# Patient Record
Sex: FEMALE | Race: WHITE | URBAN - METROPOLITAN AREA
[De-identification: names, ages, dates, MRNs, and addresses within clinical notes are randomized per-mention and may not be internally consistent; named-entity substitution may affect disease eponyms.]

---

## 2018-01-28 ENCOUNTER — HOSPITAL ENCOUNTER (OUTPATIENT)
Facility: CLINIC | Age: 83
Setting detail: OBSERVATION
Discharge: HOME | End: 2018-01-29
Attending: EMERGENCY MEDICINE | Admitting: INTERNAL MEDICINE
Payer: COMMERCIAL

## 2018-01-28 ENCOUNTER — APPOINTMENT (OUTPATIENT)
Dept: CT IMAGING | Facility: CLINIC | Age: 83
End: 2018-01-28
Attending: EMERGENCY MEDICINE
Payer: COMMERCIAL

## 2018-01-28 DIAGNOSIS — I26.99 OTHER ACUTE PULMONARY EMBOLISM WITHOUT ACUTE COR PULMONALE (H): ICD-10-CM

## 2018-01-28 PROBLEM — I82.409 DVT (DEEP VENOUS THROMBOSIS) (H): Status: ACTIVE | Noted: 2018-01-28

## 2018-01-28 LAB
ALBUMIN SERPL-MCNC: 3.5 G/DL (ref 3.4–5)
ALP SERPL-CCNC: 79 U/L (ref 40–150)
ALT SERPL W P-5'-P-CCNC: 31 U/L (ref 0–50)
ANION GAP SERPL CALCULATED.3IONS-SCNC: 7 MMOL/L (ref 3–14)
APTT PPP: 29 SEC (ref 22–37)
AST SERPL W P-5'-P-CCNC: 19 U/L (ref 0–45)
BASOPHILS # BLD AUTO: 0 10E9/L (ref 0–0.2)
BASOPHILS NFR BLD AUTO: 0.1 %
BILIRUB SERPL-MCNC: 0.8 MG/DL (ref 0.2–1.3)
BUN SERPL-MCNC: 18 MG/DL (ref 7–30)
CALCIUM SERPL-MCNC: 9 MG/DL (ref 8.5–10.1)
CHLORIDE SERPL-SCNC: 106 MMOL/L (ref 94–109)
CO2 SERPL-SCNC: 27 MMOL/L (ref 20–32)
CREAT SERPL-MCNC: 0.75 MG/DL (ref 0.52–1.04)
D DIMER PPP FEU-MCNC: 0.6 UG/ML FEU (ref 0–0.5)
DIFFERENTIAL METHOD BLD: NORMAL
EOSINOPHIL # BLD AUTO: 0.2 10E9/L (ref 0–0.7)
EOSINOPHIL NFR BLD AUTO: 2 %
ERYTHROCYTE [DISTWIDTH] IN BLOOD BY AUTOMATED COUNT: 13.9 % (ref 10–15)
GFR SERPL CREATININE-BSD FRML MDRD: 73 ML/MIN/1.7M2
GLUCOSE SERPL-MCNC: 87 MG/DL (ref 70–99)
HCT VFR BLD AUTO: 44.2 % (ref 35–47)
HGB BLD-MCNC: 14.6 G/DL (ref 11.7–15.7)
IMM GRANULOCYTES # BLD: 0 10E9/L (ref 0–0.4)
IMM GRANULOCYTES NFR BLD: 0.2 %
INR PPP: 1.03 (ref 0.86–1.14)
INTERPRETATION ECG - MUSE: NORMAL
LYMPHOCYTES # BLD AUTO: 1.5 10E9/L (ref 0.8–5.3)
LYMPHOCYTES NFR BLD AUTO: 17.8 %
MCH RBC QN AUTO: 30.2 PG (ref 26.5–33)
MCHC RBC AUTO-ENTMCNC: 33 G/DL (ref 31.5–36.5)
MCV RBC AUTO: 92 FL (ref 78–100)
MONOCYTES # BLD AUTO: 0.6 10E9/L (ref 0–1.3)
MONOCYTES NFR BLD AUTO: 7 %
NEUTROPHILS # BLD AUTO: 6.2 10E9/L (ref 1.6–8.3)
NEUTROPHILS NFR BLD AUTO: 72.9 %
NRBC # BLD AUTO: 0 10*3/UL
NRBC BLD AUTO-RTO: 0 /100
PLATELET # BLD AUTO: 212 10E9/L (ref 150–450)
POTASSIUM SERPL-SCNC: 3.8 MMOL/L (ref 3.4–5.3)
PROT SERPL-MCNC: 7.1 G/DL (ref 6.8–8.8)
RADIOLOGIST FLAGS: ABNORMAL
RBC # BLD AUTO: 4.83 10E12/L (ref 3.8–5.2)
SODIUM SERPL-SCNC: 140 MMOL/L (ref 133–144)
TROPONIN I SERPL-MCNC: 0.04 UG/L (ref 0–0.04)
WBC # BLD AUTO: 8.5 10E9/L (ref 4–11)

## 2018-01-28 PROCEDURE — 96375 TX/PRO/DX INJ NEW DRUG ADDON: CPT

## 2018-01-28 PROCEDURE — 25000128 H RX IP 250 OP 636

## 2018-01-28 PROCEDURE — 99207 ZZC DOWN CODE DUE TO INITIAL EXAM: CPT | Performed by: INTERNAL MEDICINE

## 2018-01-28 PROCEDURE — 25000132 ZZH RX MED GY IP 250 OP 250 PS 637: Performed by: EMERGENCY MEDICINE

## 2018-01-28 PROCEDURE — 99285 EMERGENCY DEPT VISIT HI MDM: CPT | Mod: 25

## 2018-01-28 PROCEDURE — G0378 HOSPITAL OBSERVATION PER HR: HCPCS

## 2018-01-28 PROCEDURE — 84484 ASSAY OF TROPONIN QUANT: CPT | Performed by: EMERGENCY MEDICINE

## 2018-01-28 PROCEDURE — 80053 COMPREHEN METABOLIC PANEL: CPT | Performed by: EMERGENCY MEDICINE

## 2018-01-28 PROCEDURE — 25000128 H RX IP 250 OP 636: Performed by: EMERGENCY MEDICINE

## 2018-01-28 PROCEDURE — 85025 COMPLETE CBC W/AUTO DIFF WBC: CPT | Performed by: EMERGENCY MEDICINE

## 2018-01-28 PROCEDURE — 96374 THER/PROPH/DIAG INJ IV PUSH: CPT | Mod: 59

## 2018-01-28 PROCEDURE — 99218 ZZC INITIAL OBSERVATION CARE,LEVL I: CPT | Performed by: INTERNAL MEDICINE

## 2018-01-28 PROCEDURE — 93005 ELECTROCARDIOGRAM TRACING: CPT

## 2018-01-28 PROCEDURE — 96361 HYDRATE IV INFUSION ADD-ON: CPT

## 2018-01-28 PROCEDURE — 85610 PROTHROMBIN TIME: CPT | Performed by: EMERGENCY MEDICINE

## 2018-01-28 PROCEDURE — 85379 FIBRIN DEGRADATION QUANT: CPT | Performed by: EMERGENCY MEDICINE

## 2018-01-28 PROCEDURE — 85730 THROMBOPLASTIN TIME PARTIAL: CPT | Performed by: EMERGENCY MEDICINE

## 2018-01-28 PROCEDURE — 25000125 ZZHC RX 250: Performed by: EMERGENCY MEDICINE

## 2018-01-28 PROCEDURE — 71260 CT THORAX DX C+: CPT

## 2018-01-28 RX ORDER — ACETAMINOPHEN 325 MG/1
650 TABLET ORAL EVERY 4 HOURS PRN
Status: DISCONTINUED | OUTPATIENT
Start: 2018-01-28 | End: 2018-01-29 | Stop reason: HOSPADM

## 2018-01-28 RX ORDER — SODIUM CHLORIDE 9 MG/ML
INJECTION, SOLUTION INTRAVENOUS ONCE
Status: COMPLETED | OUTPATIENT
Start: 2018-01-28 | End: 2018-01-28

## 2018-01-28 RX ORDER — MORPHINE SULFATE 4 MG/ML
4 INJECTION, SOLUTION INTRAMUSCULAR; INTRAVENOUS ONCE
Status: COMPLETED | OUTPATIENT
Start: 2018-01-28 | End: 2018-01-28

## 2018-01-28 RX ORDER — ZOLPIDEM TARTRATE 5 MG/1
5 TABLET ORAL
Status: DISCONTINUED | OUTPATIENT
Start: 2018-01-28 | End: 2018-01-29

## 2018-01-28 RX ORDER — LIDOCAINE 40 MG/G
CREAM TOPICAL
Status: DISCONTINUED | OUTPATIENT
Start: 2018-01-28 | End: 2018-01-29 | Stop reason: HOSPADM

## 2018-01-28 RX ORDER — ZOLPIDEM TARTRATE 5 MG/1
5 TABLET ORAL AT BEDTIME
Status: DISCONTINUED | OUTPATIENT
Start: 2018-01-28 | End: 2018-01-28

## 2018-01-28 RX ORDER — HYDROMORPHONE HYDROCHLORIDE 1 MG/ML
0.5 INJECTION, SOLUTION INTRAMUSCULAR; INTRAVENOUS; SUBCUTANEOUS
Status: DISCONTINUED | OUTPATIENT
Start: 2018-01-28 | End: 2018-01-29 | Stop reason: HOSPADM

## 2018-01-28 RX ORDER — NALOXONE HYDROCHLORIDE 0.4 MG/ML
.1-.4 INJECTION, SOLUTION INTRAMUSCULAR; INTRAVENOUS; SUBCUTANEOUS
Status: DISCONTINUED | OUTPATIENT
Start: 2018-01-28 | End: 2018-01-29 | Stop reason: HOSPADM

## 2018-01-28 RX ORDER — ONDANSETRON 4 MG/1
4 TABLET, ORALLY DISINTEGRATING ORAL EVERY 6 HOURS PRN
Status: DISCONTINUED | OUTPATIENT
Start: 2018-01-28 | End: 2018-01-29 | Stop reason: HOSPADM

## 2018-01-28 RX ORDER — IOPAMIDOL 755 MG/ML
59 INJECTION, SOLUTION INTRAVASCULAR ONCE
Status: COMPLETED | OUTPATIENT
Start: 2018-01-28 | End: 2018-01-28

## 2018-01-28 RX ORDER — METOPROLOL SUCCINATE 50 MG/1
50 TABLET, EXTENDED RELEASE ORAL DAILY
Status: DISCONTINUED | OUTPATIENT
Start: 2018-01-29 | End: 2018-01-29 | Stop reason: HOSPADM

## 2018-01-28 RX ORDER — ONDANSETRON 2 MG/ML
4 INJECTION INTRAMUSCULAR; INTRAVENOUS EVERY 6 HOURS PRN
Status: DISCONTINUED | OUTPATIENT
Start: 2018-01-28 | End: 2018-01-29 | Stop reason: HOSPADM

## 2018-01-28 RX ORDER — ONDANSETRON 2 MG/ML
INJECTION INTRAMUSCULAR; INTRAVENOUS
Status: COMPLETED
Start: 2018-01-28 | End: 2018-01-28

## 2018-01-28 RX ORDER — MULTIPLE VITAMINS W/ MINERALS TAB 9MG-400MCG
1 TAB ORAL DAILY
COMMUNITY

## 2018-01-28 RX ORDER — HYDROCODONE BITARTRATE AND ACETAMINOPHEN 5; 325 MG/1; MG/1
1-2 TABLET ORAL EVERY 4 HOURS PRN
Status: DISCONTINUED | OUTPATIENT
Start: 2018-01-28 | End: 2018-01-29 | Stop reason: HOSPADM

## 2018-01-28 RX ORDER — HYDROMORPHONE HYDROCHLORIDE 2 MG/1
2 TABLET ORAL
Status: DISCONTINUED | OUTPATIENT
Start: 2018-01-28 | End: 2018-01-29 | Stop reason: HOSPADM

## 2018-01-28 RX ORDER — ACETAMINOPHEN 650 MG/1
650 SUPPOSITORY RECTAL EVERY 4 HOURS PRN
Status: DISCONTINUED | OUTPATIENT
Start: 2018-01-28 | End: 2018-01-29 | Stop reason: HOSPADM

## 2018-01-28 RX ORDER — HYDROMORPHONE HYDROCHLORIDE 1 MG/ML
INJECTION, SOLUTION INTRAMUSCULAR; INTRAVENOUS; SUBCUTANEOUS
Status: COMPLETED
Start: 2018-01-28 | End: 2018-01-28

## 2018-01-28 RX ADMIN — HYDROMORPHONE HYDROCHLORIDE 0.5 MG: 1 INJECTION, SOLUTION INTRAMUSCULAR; INTRAVENOUS; SUBCUTANEOUS at 14:33

## 2018-01-28 RX ADMIN — SODIUM CHLORIDE 85 ML: 9 INJECTION, SOLUTION INTRAVENOUS at 14:01

## 2018-01-28 RX ADMIN — IOPAMIDOL 59 ML: 755 INJECTION, SOLUTION INTRAVENOUS at 14:01

## 2018-01-28 RX ADMIN — ONDANSETRON 4 MG: 2 INJECTION INTRAMUSCULAR; INTRAVENOUS at 16:38

## 2018-01-28 RX ADMIN — Medication 4900 UNITS: at 15:29

## 2018-01-28 RX ADMIN — MORPHINE SULFATE 4 MG: 4 INJECTION INTRAVENOUS at 13:14

## 2018-01-28 RX ADMIN — RIVAROXABAN 10 MG: 10 TABLET, FILM COATED ORAL at 16:03

## 2018-01-28 RX ADMIN — SODIUM CHLORIDE: 9 INJECTION, SOLUTION INTRAVENOUS at 15:29

## 2018-01-28 RX ADMIN — HEPARIN SODIUM 1100 UNITS/HR: 10000 INJECTION, SOLUTION INTRAVENOUS at 15:29

## 2018-01-28 ASSESSMENT — ENCOUNTER SYMPTOMS
COUGH: 0
ABDOMINAL PAIN: 0
NAUSEA: 0
FEVER: 0
MYALGIAS: 1
VOMITING: 0
SHORTNESS OF BREATH: 1
BACK PAIN: 1

## 2018-01-28 NOTE — IP AVS SNAPSHOT
[facility] Observation Unit    [address] [place] [id number]    Phone:  [phone]                                       After Visit Summary   1/28/2018    Nadia Daniels    MRN: [id number]           After Visit Summary Signature Page     I have received my discharge instructions, and my questions have been answered. I have discussed any challenges I see with this plan with the nurse or doctor.    ..........................................................................................................................................  Patient/Patient Representative Signature      ..........................................................................................................................................  Patient Representative Print Name and Relationship to Patient    ..................................................               ................................................  Date                                            Time    ..........................................................................................................................................  Reviewed by Signature/Title    ...................................................              ..............................................  Date                                                            Time

## 2018-01-28 NOTE — ED PROVIDER NOTES
History     Chief Complaint:  Chest Pain     History limited due to language barrier and subsequently provided by family at bedside.   LINDA Daniels is a 86 year old female with history of paroxysmal atrial fibrillation not anticoagulated, PVC's, and elevated troponin s/p cholecystectomy who presents to the emergency department today for evaluation of chest pain. The patient had an mildly elevated troponin after her cholecystectomy and then underwent echocardiogram in August of 2017 showing an EF of 60-65% and mild LVH. The patient reports developing mild right sided chest pain three days ago. This pain was tolerable initially and was managed at home with ibuprofen. However this morning she had onset of severe sternal chest pain that radiated to her right chest and into her back. With this she had some accompanying shortness of breath as it was painful to take breaths. Due to this the patient presented in the emergency department for evaluation. The patient states her pain is constant. The pain is worsened with deep inspiration, and thus she has not been breathing very deeply. She states there was no shortness of breath prior to today. The patient denies any recent fever or cough. She denies any current abdominal pain, nausea, vomiting. However she does note five days ago having one day of vomiting.     Cardiac/PE/DVT Risk Factors:  History of hypertension - Positive   History of hyperlipidemia - Negative   History of diabetes - Negative   History of smoking - Negative   Personal history of PE/DVT - Negative   Family history of PE/DVT - Negative   Family history of heart complications - Negative   Recent travel - Negative    Recent surgery - Negative   Other immobilizations - Negative   Cancer - Negative      Allergies:  No Known Drug Allergies      Medications:    Ambien 10mg PO  Aspirin 81mg PO  Metoprolol 50mg PO     Past Medical History:    Arrhythmia   Paroxysmal atrial fibrillation  Cholecystitis  "  Elevated troponin     Past Surgical History:    Hysterectomy   Sinus surgery  Cataract removal   Wrist fracutre fixation  Cholecystectomy, laparoscopy    Family History:    History reviewed. No pertinent family history.      Social History:  The patient was accompanied to the ED by family.  Smoking Status: Never smoker  Smokeless Tobacco: Current user  Alcohol Use: No       Review of Systems   Constitutional: Negative for fever.   Respiratory: Positive for shortness of breath. Negative for cough.    Cardiovascular: Positive for chest pain.   Gastrointestinal: Negative for abdominal pain, nausea and vomiting.   Musculoskeletal: Positive for back pain (upper right) and myalgias (right arm).   All other systems reviewed and are negative.    Physical Exam     Patient Vitals for the past 24 hrs:   BP Temp Temp src Pulse Heart Rate Resp SpO2 Height Weight   01/28/18 1716 - (P) 97.8  F (36.6  C) - - - (P) 18 - - -   01/28/18 1435 155/81 - - - - - 92 % - -   01/28/18 1315 174/89 - - - 71 20 95 % - -   01/28/18 1252 (!) 199/106 98.7  F (37.1  C) Oral 70 70 18 97 % 1.65 m (5' 4.96\") 67 kg (147 lb 11.3 oz)     Physical Exam  Physical Exam   Constitutional:  Patient is oriented. They appear well-developed and well-nourished. Mild distress secondary to chest pain.   HENT:   Mouth/Throat:   Oropharynx is clear and moist.   Eyes:    Conjunctivae normal and EOM are normal. Pupils are equal, round, and reactive to light.   Neck:    Normal range of motion.   Cardiovascular: Normal rate, regular rhythm and normal heart sounds.  Exam reveals no gallop and no friction rub.  No murmur heard.  Pulmonary/Chest:  Slightly diminished breath sounds bilaterally. Patient has no wheezes. Patient has no rales. Right sided pleuritic pain.   Abdominal:   Soft. Bowel sounds are normal. Patient exhibits no mass. There is no tenderness. There is no rebound and no guarding.   Musculoskeletal:  Normal range of motion. Trace pedal edema. "   Neurological:   Patient is alert. Patient has normal strength. No cranial nerve deficit or sensory deficit.   Skin:   Skin is warm and dry. No rash noted. No erythema.   Psychiatric:   Patient has a normal mood and behavior.    Emergency Department Course     ECG:  Indication: Chest pain   Completed at 1240.   Sinus rhythm with occasional premature ventricular complexes. Nonspecific ST abnormality. Abnormal EKG.  Rate 75 bpm. VT interval 194. QRS duration 100. QT/QTc 374/417. P-R-T axes 65 65 54.     Imaging:  Radiology findings were communicated with the patient and family who voiced understanding of the findings.    Chest CT with IV Contrast:   IMPRESSION: Tiny right PE.  Report per radiology.       Laboratory:  Laboratory findings were communicated with the patient and family who voiced understanding of the findings.    CBC: WBC 8.5, HGB 14.6,   CMP: WNL. (Creatinine 0.75)   Troponin (Collected 1251): 0.040   D Dimer (Collected 1251): 0.6 (H)  PTT: 29  INR: 1.03       Interventions:  1314 Morphine 4mg IV   1433 Dilaudid 0.5mg IV    1529 Heparin 4,900 units IV   1529 Heparin 1,100 units/hr IV - discontinued  1542: Xarelto 10 mg PO     Emergency Department Course:  Nursing notes and vitals reviewed.  1305 I performed an exam of the patient as documented above.   EKG obtained in the ED, see results above.    IV was inserted and blood was drawn for laboratory testing, results above.   The patient was sent for a chest CT while in the emergency department, results above.    1436 I rechecked the patient and updated her on her CT and lab results.   1450 I spoke with Dr. Mackey of the Hospitalist service regarding patient's presentation, findings, and plan of care.   I discussed the treatment plan with the patient. They expressed understanding of this plan and consented to admission. I discussed the patient with Dr. Mackey, who will admit the patient to a monitored bed for further evaluation and treatment. I  personally reviewed the laboratory and imaging results with the Patient and family at bedside and answered all related questions prior to admission.   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 She denies any injury. She has no known CAD. She was recently seen by cardiology last fall for mildly elevated troponins in the setting of cholecystitis. Echocardiogram was OK and no further cardiac testing recommended. EKG here shows no signs of ischemia. There are nonspecific findings. She was significantly hypertensive upon arrival and likely is secondary to pain as this did come down once her pain was better controlled. Diagnostic evaluation was obtained. She has no evidence of metabolic derangement. She has no evidence of leukocytosis or acute anemia. Her cardiac enzyme, while detectable, is WNL. D-dimer was slightly elevated therefore a CT of her chest was performed which shows a small PE on the right lower lobe which is likely causing her pain, no other acute findings. No signs of right heart strain, she is not hypoxic but is very uncomfortable. At this point she was made aware of the findings. She does need anticoagulation and I initially dosed her with heparin however after she spoke with the admitting physician it was decided to change her over to Xarelto so she was dosed with this and heparin was discontinued.     Her PESI score is 86 which puts her at intermediate risk for complications. I am admitting her to Dr. Mackey for med telemetry bed.     Diagnosis:    ICD-10-CM    1. Other acute pulmonary embolism without acute cor pulmonale (H) I26.99        Disposition:  Admitted under the supervision of Dr. Mackey     Scribedward Disclosure:  IDavie, am serving as a scribe at 12:39 PM on 1/28/2018 to document services personally performed by  Sharda García MD based on my observations and the provider's statements to me.    1/28/2018    EMERGENCY DEPARTMENT       Sharda García MD  01/28/18 1826

## 2018-01-28 NOTE — ED NOTES
"[facility]  ED Nurse Handoff Report    ED Chief complaint: Chest Pain (started in the right side and now c/o sob. )      ED Diagnosis:   Final diagnoses:   Pleural effusion       Code Status: TBD at admission    Allergies: No Known Allergies    Activity level - Baseline/Home:  Independent    Activity Level - Current:   Independent     Needed?: Yes, Turkmen    Isolation: No  Infection: Not Applicable    Bariatric?: No    Vital Signs:   Vitals:    01/28/18 1252 01/28/18 1315 01/28/18 1435   BP: (!) 199/106 174/89 155/81   Pulse: 70     Resp: 18 20    Temp: 98.7  F (37.1  C)     TempSrc: Oral     SpO2: 97% 95% 92%   Weight: 67 kg (147 lb 11.3 oz)     Height: 1.65 m (5' 4.96\")         Cardiac Rhythm: ,   Cardiac  Cardiac Rhythm: Atrial fibrillation    Pain level: 0-10 Pain Scale: 8    Is this patient confused?: No    Patient Report: Initial Complaint: Nadia Daniels is a 86 year old female with history of paroxysmal atrial fibrillation, PVC's, and elevated troponin s/p cholecystectomy who presents to the emergency department today for evaluation of chest pain. The patient had an elevated troponin after her cholecystectomy and then underwent echocardiogram in August of 2017 showing an EF of 60-65% and mild LVH. The patient reports developing mild chest pain three days ago. This pain was tolerable initially and was managed at home with ibuprofen. However this morning she had onset of severe sternal chest pain that radiated to her right chest and into her back. With this she had some accompanying shortness of breath. Due to this the patient presented in the emergency department for evaluation. The patient states her pain is constant. The pain is worsened with deep inspiration, and thus she has not been breathing very deeply. She states there was no shortness of breath prior to today. The patient denies any recent fever or cough. She denies any current abdominal pain, nausea, vomiting.  Focused " Assessment: pain right upper chest, some SOB, pain worse with deep inspiration  Tests Performed: labs, ekg, chest CT  Abnormal Results:  Results for orders placed or performed during the hospital encounter of 01/28/18 (from the past 24 hour(s))   EKG 12-lead, tracing only   Result Value Ref Range    Interpretation ECG Click View Image link to view waveform and result    CBC with platelets differential   Result Value Ref Range    WBC 8.5 4.0 - 11.0 10e9/L    RBC Count 4.83 3.8 - 5.2 10e12/L    Hemoglobin 14.6 11.7 - 15.7 g/dL    Hematocrit 44.2 35.0 - 47.0 %    MCV 92 78 - 100 fl    MCH 30.2 26.5 - 33.0 pg    MCHC 33.0 31.5 - 36.5 g/dL    RDW 13.9 10.0 - 15.0 %    Platelet Count 212 150 - 450 10e9/L    Diff Method Automated Method     % Neutrophils 72.9 %    % Lymphocytes 17.8 %    % Monocytes 7.0 %    % Eosinophils 2.0 %    % Basophils 0.1 %    % Immature Granulocytes 0.2 %    Nucleated RBCs 0 0 /100    Absolute Neutrophil 6.2 1.6 - 8.3 10e9/L    Absolute Lymphocytes 1.5 0.8 - 5.3 10e9/L    Absolute Monocytes 0.6 0.0 - 1.3 10e9/L    Absolute Eosinophils 0.2 0.0 - 0.7 10e9/L    Absolute Basophils 0.0 0.0 - 0.2 10e9/L    Abs Immature Granulocytes 0.0 0 - 0.4 10e9/L    Absolute Nucleated RBC 0.0    Comprehensive metabolic panel   Result Value Ref Range    Sodium 140 133 - 144 mmol/L    Potassium 3.8 3.4 - 5.3 mmol/L    Chloride 106 94 - 109 mmol/L    Carbon Dioxide 27 20 - 32 mmol/L    Anion Gap 7 3 - 14 mmol/L    Glucose 87 70 - 99 mg/dL    Urea Nitrogen 18 7 - 30 mg/dL    Creatinine 0.75 0.52 - 1.04 mg/dL    GFR Estimate 73 >60 mL/min/1.7m2    GFR Estimate If Black 89 >60 mL/min/1.7m2    Calcium 9.0 8.5 - 10.1 mg/dL    Bilirubin Total 0.8 0.2 - 1.3 mg/dL    Albumin 3.5 3.4 - 5.0 g/dL    Protein Total 7.1 6.8 - 8.8 g/dL    Alkaline Phosphatase 79 40 - 150 U/L    ALT 31 0 - 50 U/L    AST 19 0 - 45 U/L   Troponin I   Result Value Ref Range    Troponin I ES 0.040 0.000 - 0.045 ug/L   D dimer quantitative   Result Value  Ref Range    D Dimer 0.6 (H) 0.0 - 0.50 ug/ml FEU   INR   Result Value Ref Range    INR 1.03 0.86 - 1.14   Partial thromboplastin time   Result Value Ref Range    PTT 29 22 - 37 sec   CT Chest Pulmonary Embolism w Contrast   Result Value Ref Range    Radiologist flags Tiny PE (AA)     Narrative    CT CHEST PULMONARY EMBOLISM WITH CONTRAST  1/28/2018 2:13 PM     HISTORY: Chest pain.    COMPARISON: None.    TECHNIQUE: Pulmonary embolism protocol with attention to the pulmonary  arteries.  IV contrast: 59 mL Isovue-370. Coronal reconstructions.  Radiation dose for this scan was reduced using automated exposure  control, adjustment of the mA and/or kV according to patient size, or  iterative reconstruction technique.    FINDINGS: No thoracic aortic dissection. There is a tiny right lower  lobe pulmonary embolism on axial images 114-116. This measures 0.7 cm  in length.   [Critical Result: Tiny PE]    Finding was identified on 1/28/2018 2:15 PM.     Dr. García was contacted by me on 1/28/2018 2:18 PM and verbalized  understanding of the critical result.     Small left liver lesion which has a density measurement consistent  with a cyst. No pleural effusion. Bilateral atelectasis, greater on  the left.       Impression    IMPRESSION: Tiny right PE.     Treatments provided: 4 mg morphine, 0.5 mg dilaudid. Received heparin bolus but admitting Hospitalist stopped infusion and changed to PO xeralto    Family Comments: Daughter present at bedside and is acting as     OBS brochure/video discussed/provided to patient: Yes    ED Medications:   Medications   HYDROmorphone (PF) (DILAUDID) injection 0.5 mg (0.5 mg Intravenous Given 1/28/18 1433)   heparin infusion 25,000 units in 0.45% NaCl 250 mL (0 Units/hr Intravenous Stopped 1/28/18 1542)   morphine (PF) injection 4 mg (4 mg Intravenous Given 1/28/18 1314)   100mL saline flush (85 mLs Intravenous Given 1/28/18 1401)   iopamidol (ISOVUE-370) solution 59 mL (59 mLs  Intravenous Given 1/28/18 1401)   heparin Loading Dose bolus dose from infusion pump 4,900 Units (4,900 Units Intravenous Given 1/28/18 1529)   0.9% sodium chloride infusion ( Intravenous New Bag 1/28/18 1529)   rivaroxaban ANTICOAGULANT (XARELTO) tablet 10 mg (10 mg Oral Given 1/28/18 1603)       Drips infusing?:  Yes      ED NURSE PHONE NUMBER: *[phone]

## 2018-01-28 NOTE — PLAN OF CARE
RECEIVING UNIT ED HANDOFF REVIEW    ED Nurse Handoff Report was reviewed by: Tania Fonseca on January 28, 2018 at 3:30 PM        Please show pt and family obs video/brochure in ED.

## 2018-01-28 NOTE — PHARMACY-ADMISSION MEDICATION HISTORY
Admission medication history interview status for the 1/28/2018  admission is complete. See EPIC admission navigator for prior to admission medications     Medication history source reliability:Good    Actions taken by pharmacist (provider contacted, etc):Verified medications with patient's family member who is currently in pharmacy school and knew dosages of her medications.      Additional medication history information not noted on PTA med list :  1) Patient did receive 600mg of ibuprofen last night (1/27/2018) but typically does not take this medication    Medication reconciliation/reorder completed by provider prior to medication history? No    Time spent in this activity: 10 minutes    Prior to Admission medications    Medication Sig Last Dose Taking? Auth Provider   Zolpidem Tartrate (AMBIEN PO) Take 5 mg by mouth At Bedtime (Takes half of a 10mg tablet) 1/27/2018 at hs Yes Unknown, Entered By History   multivitamin, therapeutic with minerals (MULTI-VITAMIN) TABS tablet Take 1 tablet by mouth daily 1/28/2018 at am Yes Unknown, Entered By History   METOPROLOL SUCCINATE ER PO Take 50 mg by mouth daily 1/28/2018 at am Yes Unknown, Entered By History   ASPIRIN EC PO Take 81 mg by mouth daily 1/28/2018 at am x 3 doses Yes Unknown, Entered By History

## 2018-01-28 NOTE — H&P
Admitted:     01/28/2018      HISTORY OF PRESENT ILLNESS:  The patient is an 86-year-old female, previously in good health who developed chest discomfort 3 days ago on the right side.  It is in the right mid chest radiating to her back, worse with certain body positions and deep breaths.  The patient denied shortness of breath, leg swelling, any airplane or prolonged traveling, denies change in mobility or prolonged inactivity.  No prior history of DVT or pulmonary embolus.  On the emergency room evaluation, found borderline elevated D-dimer and CT imaging revealed a tiny right lower lobe pulmonary embolus.  There was some bilateral atelectasis noted.  No effusion.  No signs of malignancy.  The patient denies nausea, abdominal pain, change in bowel habits or extremity discomfort.      PAST MEDICAL HISTORY:  Significant for cholecystitis summer of 2017 complicated by paroxysmal atrial fibrillation and mild elevated troponin level.  The patient was initially started on Xarelto 10 mg daily with a followup echocardiogram in 08/2017 revealing mild LVH, otherwise normal ejection fraction and then the patient was switched to a daily aspirin.  History of PVCs and palpitations.  The patient has history of seasonal allergies and occasional low back pain.      MEDICATIONS:  Metoprolol 50 mg 1 tablet daily, aspirin 81 mg 1 tablet daily.      ALLERGIES:  The patient was taking the postoperative hydrocodone, acetaminophen for cholecystectomy when she did develop her atrial fibrillation.  This is not considered a real allergy.        PAST SURGICAL HISTORY:  Hysterectomy, sinus surgery, cataract surgery, wrist fracture management and laparoscopic cholecystectomy.      FAMILY HISTORY:  Mother had reportable stomach cancer at age 45.      SOCIAL HISTORY:  The patient is a nonsmoker.  Drinks alcohol socially.      REVIEW OF SYMPTOMS:  Negative.      PHYSICAL EXAMINATION:   VITAL SIGNS:  Blood pressure 155/80, heart rate 80, O2 sat  91-95% on room air, afebrile.   GENERAL:  The patient is alert and oriented.   HEENT:  Conjunctivae are pink.  Sclerae are nonicteric.  Oropharynx was clear.   LUNGS:  Clear breath sounds and mild crackles at the bases.  No wheezes or rhonchi.   CARDIAC:  Regular rate and rhythm without murmur or gallop.   ABDOMEN:  Normal bowel sounds without organomegaly, masses or tenderness.   EXTREMITIES:  No swelling or induration.  Normal pulses.  Gait not tested.      LABORATORY DATA:  Sodium 140, potassium 3.8, BUN and creatinine 18 and 0.75.  Albumin normal 3.5.  Transaminases all normal.  Troponin level minimal 0.040.  Hemoglobin 14.6, white count 8500 and platelet count 212,000.      IMPRESSION AND PLAN:     1.  The patient with symptoms of acute right-sided small pulmonary embolus without associated findings of lower extremity venous obstruction.  This was an unprovoked episode this patient has absolutely no risk factors for deep venous thrombosis or pulmonary embolism.  The patient has been on Xarelto in the past for paroxysmal atrial fibrillation after her gallbladder surgery and tolerated this medication well.  She agrees to take this for her pulmonary embolus.  The patient is aware that the combination of her paroxysmal atrial fibrillation and now an unprovoked pulmonary embolus would require long-term anticoagulation.  Discharge will occur once patient has adequate pain control.  The patient required morphine and then in addition to that IV Dilaudid in the emergency room to obtain adequate pain control  upon presentation.  Also will monitor O2 sats to ensure adequate oxygenation prior to discharge from observation care.  Expectoration of hospitalization to be less than 2 midnights.   2.  Reactive hypertension secondary to pain.  Will monitor patient's vital signs.   3.  History of paroxysmal atrial fibrillation, presently sinus rhythm with occasional premature ventricular complexes.  Continue metoprolol and Xarelto  will be initiated.         SEBASTIAN AGUILAR MD             D: 2018   T: 2018   MT: JOSH      Name:     CEAASR SHELL   MRN:      [id number]        Account:      [id number]   :      1931        Admitted:     2018                   Document: [id number]

## 2018-01-28 NOTE — LETTER
Transition Communication Hand-off for Care Transitions to Next Level of Care Provider    Name: Nadia Daniels  MRN #: [id number]  Primary Care Provider: Clinton Tian [facility]  Primary Care MD Name: Saeed Kovacsar  Primary Clinic: [address] [facility] [address]  Primary Care Clinic Name: [facility]  Reason for Hospitalization:  Pleural effusion [J90]  Admit Date/Time: 1/28/2018 12:33 PM  Discharge Date: 1/29/18  Payor Source: No coverage found.      Reason for Communication Hand-off Referral: Difficulty understanding plan of care  Multiple providers/specialties    Discharge Plan: patient with new right lower lobe pulmonary embolism.  Will require long term AC at discharge.         Concern for non-adherence with plan of care:   Y/N n  Discharge Needs Assessment:  Needs       Most Recent Value    # of Referrals Placed by CTS External Care Coordination, Scheduled Follow-up appointments, Communication hand-offs to next level of Care Providers          Follow-up plan:  Future Appointments  Date Time Provider Department Center   1/30/2018 8:00 AM [facility]       Any outstanding tests or procedures:              Key Recommendations:      Ana M Martinez    AVS/Discharge Summary is the source of truth; this is a helpful guide for improved communication of patient story

## 2018-01-28 NOTE — IP AVS SNAPSHOT
MRN:[id number]                      After Visit Summary   1/28/2018    Nadia Daniels    MRN: [id number]           Thank you!     Thank you for choosing [facility] for your care. Our goal is always to provide you with excellent care. Hearing back from our patients is one way we can continue to improve our services. Please take a few minutes to complete the written survey that you may receive in the mail after you visit with us. Thank you!        Patient Information     Date Of Birth          5/8/1931        About your hospital stay     You were admitted on:  January 28, 2018 You last received care in the:  [facility] Observation Unit    You were discharged on:  January 29, 2018        Reason for your hospital stay       Observation hospitalization for acute right-sided small pulmonary embolus                  Who to Call     For medical emergencies, please call 911.  For non-urgent questions about your medical care, please call your primary care provider or clinic, [phone]          Attending Provider     Provider Specialty    Sharda García MD Emergency Medicine    [place], Josh YUAN MD Internal Medicine       Primary Care Provider Office Phone # Fax #    [facility] [phone]      After Care Instructions     Activity       Your activity upon discharge: activity as tolerated            Diet       Follow this diet upon discharge: Regular                  Follow-up Appointments     Follow-up and recommended labs and tests        Follow up with primary care provider, [facility], within 7 days for hospital follow- up.  Your Xarelto dose will need to be changed after 21 days, this will be done by your primary care provider.  You are scheduled for a post hospital follow up visit with Dr. Saeed Goodman on Monday February 5th at 11:40 a.m. Located at:  [facility]  [address], [place] [address]  Phone: [phone]  [phone]  Please call the number listed above if you need to cancel or reschedule your appointment.  They will provide an  for this visit.                  Further instructions from your care team             Pending Results     No orders found for last 3 day(s).            Statement of Approval     Ordered          [id number]  I have reviewed and agree with all the recommendations and orders detailed in this document.  EFFECTIVE NOW     Approved and electronically signed by:  Junior Rodriguez PA             Admission Information     Date & Time Provider Department Dept. Phone    2018 Josh Mackey MD [facility] Observation Unit [phone]      Your Vitals Were     Blood Pressure Pulse Temperature Respirations Height Weight    147/67 70 97.2  F (36.2  C) (Oral) 18 1.65 m (5' 4.96\") 66.4 kg (146 lb 6.2 oz)    Pulse Oximetry BMI (Body Mass Index)                92% 24.39 kg/m2          MyChart Information     [facility] lets you send messages to your doctor, view your test results, renew your prescriptions, schedule appointments and more. To sign up, go to [email or web address] . Click on \"Log in\" on the left side of the screen, which will take you to the Welcome page. Then click on \"Sign up Now\" on the right side of the page.     You will be asked to enter the access code listed below, as well as some personal information. Please follow the directions to create your username and password.     Your access code is: [email or web address]  Expires: 2018  1:06 PM     Your access code will  in 90 days. If you need help or a new code, please call your [facility] clinic or [phone].        Care EveryWhere ID     This is your Care EveryWhere ID. This could be used by other organizations to access your [facility] medical records  [id number]        Equal Access to Services     THOMAS ROQUE AH: Roger Mcnair, wacasandra turner, qaybta kaalyrn davis, lucina nur " lakate meza So [facility] [phone].    ATENCIÓN: Si habla archie, tiene a montaño disposición servicios gratuitos de asistencia lingüística. Jostin casey [phone].    We comply with applicable federal civil rights laws and [place] laws. We do not discriminate on the basis of race, color, national origin, age, disability, sex, sexual orientation, or gender identity.               Review of your medicines      START taking        Dose / Directions    HYDROcodone-acetaminophen 5-325 MG per tablet   Commonly known as:  NORCO   Used for:  Other acute pulmonary embolism without acute cor pulmonale (H)        Dose:  1-2 tablet   Take 1-2 tablets by mouth every 4 hours as needed for other (pain control or improvement in physical function.)   Quantity:  18 tablet   Refills:  0       rivaroxaban ANTICOAGULANT 15 MG Tabs tablet   Commonly known as:  XARELTO   Used for:  Other acute pulmonary embolism without acute cor pulmonale (H)   Notes to Patient:  TAKE AS DIRECTED         15 mg twice daily with food for 3 weeks, then 20 mg once daily with food.   Quantity:  42 tablet   Refills:  0         CONTINUE these medicines which have NOT CHANGED        Dose / Directions    AMBIEN PO        Dose:  5 mg   Take 5 mg by mouth At Bedtime (Takes half of a 10mg tablet)   Refills:  0       METOPROLOL SUCCINATE ER PO        Dose:  50 mg   Take 50 mg by mouth daily   Refills:  0       Multi-vitamin Tabs tablet        Dose:  1 tablet   Take 1 tablet by mouth daily   Refills:  0         STOP taking     ASPIRIN EC PO                Where to get your medicines      These medications were sent to [facility] [place], [place] - [address]  [address] [facility] [address], [place] [address]     Phone:  [phone]     rivaroxaban ANTICOAGULANT 15 MG Tabs tablet         Some of these will need a paper prescription and others can be bought over the counter. Ask your nurse if you have questions.     Bring a paper prescription for each of these  medications     HYDROcodone-acetaminophen 5-325 MG per tablet                Protect others around you: Learn how to safely use, store and throw away your medicines at www.disposemymeds.org.        Information about OPIOIDS     PRESCRIPTION OPIOIDS: WHAT YOU NEED TO KNOW    Prescription opioids can be used to help relieve moderate to severe pain and are often prescribed following a surgery or injury, or for certain health conditions. These medications can be an important part of treatment but also come with serious risks. It is important to work with your health care provider to make sure you are getting the safest, most effective care.    WHAT ARE THE RISKS AND SIDE EFFECTS OF OPIOID USE?  Prescription opioids carry serious risks of addiction and overdose, especially with prolonged use. An opioid overdose, often marked by slowed breathing can cause sudden death. The use of prescription opioids can have a number of side effects as well, even when taken as directed:      Tolerance - meaning you might need to take more of a medication for the same pain relief    Physical dependence - meaning you have symptoms of withdrawal when a medication is stopped    Increased sensitivity to pain    Constipation    Nausea, vomiting, and dry mouth    Sleepiness and dizziness    Confusion    Depression    Low levels of testosterone that can result in lower sex drive, energy, and strength    Itching and sweating    RISKS ARE GREATER WITH:    History of drug misuse, substance use disorder, or overdose    Mental health conditions (such as depression or anxiety)    Sleep apnea    Older age (65 years or older)    Pregnancy    Avoid alcohol while taking prescription opioids.   Also, unless specifically advised by your health care provider, medications to avoid include:    Benzodiazepines (such as Xanax or Valium)    Muscle relaxants (such as Soma or Flexeril)    Hypnotics (such as Ambien or Lunesta)    Other prescription opioids    KNOW  YOUR OPTIONS:  Talk to your health care provider about ways to manage your pain that do not involve prescription opioids. Some of these options may actually work better and have fewer risks and side effects:    Pain relievers such as acetaminophen, ibuprofen, and naproxen    Some medications that are also used for depression or seizures    Physical therapy and exercise    Cognitive behavioral therapy, a psychological, goal-directed approach, in which patients learn how to modify physical, behavioral, and emotional triggers of pain and stress    IF YOU ARE PRESCRIBED OPIOIDS FOR PAIN:    Never take opioids in greater amounts or more often than prescribed    Follow up with your primary health care provider and work together to create a plan on how to manage your pain.    Talk about ways to help manage your pain that do not involve prescription opioids    Talk about all concerns and side effects    Help prevent misuse and abuse    Never sell or share prescription opioids    Never use another person's prescription opioids    Store prescription opioids in a secure place and out of reach of others (this may include visitors, children, friends, and family)    Visit www.cdc.gov/drugoverdose to learn about risks of opioid abuse and overdose    If you believe you may be struggling with addiction, tell your health care provider and ask for guidance or call [facility]'s National Helpline at 3-628-709-HELP    LEARN MORE / www.cdc.gov/drugoverdose/prescribing/guideline.html    Safely dispose of unused prescription opioids: Find your local drug take-back programs and more information about the importance of safe disposal at www.doseofreality.[place].gov             Medication List: This is a list of all your medications and when to take them. Check marks below indicate your daily home schedule. Keep this list as a reference.      Medications           Morning Afternoon Evening Bedtime As Needed    AMBIEN PO   Take 5 mg by mouth At Bedtime  (Takes half of a 10mg tablet)                                   HYDROcodone-acetaminophen 5-325 MG per tablet   Commonly known as:  NORCO   Take 1-2 tablets by mouth every 4 hours as needed for other (pain control or improvement in physical function.)                                   METOPROLOL SUCCINATE ER PO   Take 50 mg by mouth daily   Last time this was given:  50 mg on 1/29/2018  8:10 AM   Next Dose Due:  TOMORROW                                   Multi-vitamin Tabs tablet   Take 1 tablet by mouth daily   Next Dose Due:  TODAY                                   rivaroxaban ANTICOAGULANT 15 MG Tabs tablet   Commonly known as:  XARELTO   15 mg twice daily with food for 3 weeks, then 20 mg once daily with food.   Last time this was given:  10 mg on 1/28/2018  4:03 PM   Notes to Patient:  TAKE AS DIRECTED

## 2018-01-29 VITALS
HEART RATE: 70 BPM | TEMPERATURE: 97.2 F | BODY MASS INDEX: 24.39 KG/M2 | RESPIRATION RATE: 18 BRPM | SYSTOLIC BLOOD PRESSURE: 147 MMHG | DIASTOLIC BLOOD PRESSURE: 67 MMHG | OXYGEN SATURATION: 92 % | WEIGHT: 146.39 LBS | HEIGHT: 65 IN

## 2018-01-29 PROCEDURE — G0378 HOSPITAL OBSERVATION PER HR: HCPCS

## 2018-01-29 PROCEDURE — 99217 ZZC OBSERVATION CARE DISCHARGE: CPT | Performed by: PHYSICIAN ASSISTANT

## 2018-01-29 PROCEDURE — 25000132 ZZH RX MED GY IP 250 OP 250 PS 637: Performed by: INTERNAL MEDICINE

## 2018-01-29 RX ORDER — HYDROCODONE BITARTRATE AND ACETAMINOPHEN 5; 325 MG/1; MG/1
1-2 TABLET ORAL EVERY 4 HOURS PRN
Qty: 18 TABLET | Refills: 0 | Status: SHIPPED | OUTPATIENT
Start: 2018-01-29

## 2018-01-29 RX ADMIN — METOPROLOL SUCCINATE 50 MG: 50 TABLET, EXTENDED RELEASE ORAL at 08:10

## 2018-01-29 NOTE — PLAN OF CARE
List all goals to be met before discharge home:  - Anticoagulants initiated- met, started on xarelto  - Patient education re: Deep Vein Thrombosis, LMWH, Coumadin- not  - Diagnostic tests and consults completed and resulted  - Vital signs normal or at patient baseline  - Adequate pain control on oral analgesia if ordered  - Return to baseline functional status  - Safe disposition plan has been identified  - Nurse to notify provider when observation goals have been met and patient is ready for discharge.    Reports 7/10 chest pain, declines medication at this time. 1 episode of emesis upon arrival to unit, nausea relieved w/zofran. Poor appetite. Ambulating SBA. Denies SOB.      requested for 9:30 a.m.-1:30 p.m. tomorrow. Used  phone this evening.     Pt lives w/daughter in [place], but daughter is out of town and pt is staying w/relatives Lazarus (grandson) and Yolis- contact info in chart.

## 2018-01-29 NOTE — PLAN OF CARE
Problem: Patient Care Overview  Goal: Plan of Care/Patient Progress Review  Outcome: Adequate for Discharge Date Met: 01/29/18  Patient discharged to home  by wheelchair at 3:38 PM 01/29/18.  Medication regimen and new medications discussed with patient and patient verbalizes understanding. Diet and activity and wound care discussed with patient. Upcoming appointments reviewed. No questions at this time.  and Daughter in law was at the time of discharge instruction.

## 2018-01-29 NOTE — PLAN OF CARE
Problem: Patient Care Overview  Goal: Discharge Needs Assessment  Outcome: No Change  - Anticoagulants initiated: Met  - Diagnostic tests and consults completed and resulted : NA  - Vital signs normal or at patient baseline: Met  - Adequate pain control on oral analgesia if ordered: Met  - Return to baseline functional status: Met  - Safe disposition plan has been identified: Met    Alert and oriented, Ethiopian speaking. VSS. Tele sinus mario. Reports mild chest discomfort with deep breaths. Up with stand by assist. Plan for discharge today when family available to transport.

## 2018-01-29 NOTE — PLAN OF CARE
Problem: Patient Care Overview  Goal: Discharge Needs Assessment  Outcome: No Change  - Anticoagulants initiated: Met  - Diagnostic tests and consults completed and resulted : NA  - Vital signs normal or at patient baseline: Met  - Adequate pain control on oral analgesia if ordered: Met  - Return to baseline functional status: Met  - Safe disposition plan has been identified: Partially met

## 2018-01-29 NOTE — PLAN OF CARE
Problem: Patient Care Overview  Goal: Discharge Needs Assessment  Outcome: No Change  - Anticoagulants initiated: Met, DC  - Diagnostic tests and consults completed and resulted : NA  - Vital signs normal or at patient baseline: Met  - Adequate pain control on oral analgesia if ordered: Met  - Return to baseline functional status: Met  - Safe disposition plan has been identified: Met

## 2018-01-29 NOTE — DISCHARGE SUMMARY
[facility]    Discharge Summary  Hospitalist    Date of Admission:  1/28/2018  Date of Discharge:  1/29/2018  Discharging Provider: Junior Rodriguez  Date of Service (when I saw the patient): 01/29/18    Discharge Diagnoses   Acute right-sided small pulmonary embolism, unprovoked  Paroxysmal atrial fibrillation  Hypertension    History of Present Illness   The patient is an 86-year-old female, previously in good health who developed chest discomfort 3 days ago on the right side.  It is in the right mid chest radiating to her back, worse with certain body positions and deep breaths.  The patient denied shortness of breath, leg swelling, any airplane or prolonged traveling, denies change in mobility or prolonged inactivity.  No prior history of DVT or pulmonary embolus.  On the emergency room evaluation, found borderline elevated D-dimer and CT imaging revealed a tiny right lower lobe pulmonary embolus.  There was some bilateral atelectasis noted.  No effusion.  No signs of malignancy. Admitted to observation.    Hospital Course   Nadia Daniels was admitted on 1/28/2018.  The following problems were addressed during her hospitalization:    Acute right-sided small pulmonary embolus without associated findings of lower extremity venous obstruction.  This was an unprovoked episode this patient has absolutely no risk factors for deep venous thrombosis or pulmonary embolism.  The patient has been on Xarelto in the past for paroxysmal atrial fibrillation after her gallbladder surgery and tolerated this medication well.  She agrees to take this for her pulmonary embolus.  The patient is aware that the combination of her paroxysmal atrial fibrillation and now an unprovoked pulmonary embolus would require long-term anticoagulation.    - The patient required morphine and IV Dilaudid in the emergency room to obtain adequate pain control upon presentation. She now has adequate pain control without medications  here, but would like to have some Norco on hand for home.  A limited prescription for this was provided.    - She has no hypoxia and is hemodynamically stable.  Her PESI score is intermediate, but for her advanced age alone.  - Prescription for Xarelto 15 mg BID for 3 weeks, then 20 mg daily.  - Close PCP follow up within 1 week.     Reactive hypertension secondary to pain.  Blood pressures have improved and now are adequately controlled. She is continued on PTA metoprolol.      History of paroxysmal atrial fibrillation, presently sinus rhythm with occasional premature ventricular complexes.    - CHADS-VASC sore of 4  - Continue metoprolol and Xarelto has been initiated.    Junior Rodriguez PA-C    Patient was seen using a [place] speaking     I personally saw the patient on day of discharge and discussed plans of care.  This patient was discussed with Dr. Mackey, who is in agreement with my assessment and plan of care.    Significant Results and Procedures   CT chest PE protocol with results as detailed below.    Pending Results   None    Code Status   Full Code       Primary Care Physician   [facility]    Discharge Disposition   Discharged to home  Condition at discharge: Stable    Consultations This Hospital Stay   None    Discharge Orders     Reason for your hospital stay   Observation hospitalization for acute right-sided small pulmonary embolus     Follow-up and recommended labs and tests    Follow up with primary care provider, [facility], within 7 days for hospital follow- up.  Your Xarelto dose will need to be changed after 21 days, this will be done by your primary care provider.  You are scheduled for a post hospital follow up visit with Dr. Saeed Goodman on Monday February 5th at 11:40 a.m. Located at:  [facility]  [address], [place] [address]  Phone: [phone]  Please call the number listed above if you need to  cancel or reschedule your appointment.  They will provide an  for this visit.     Activity   Your activity upon discharge: activity as tolerated     Full Code     Diet   Follow this diet upon discharge: Regular       Discharge Medications   Current Discharge Medication List      START taking these medications    Details   rivaroxaban ANTICOAGULANT (XARELTO) 15 MG TABS tablet 15 mg twice daily with food for 3 weeks, then 20 mg once daily with food.  Qty: 42 tablet, Refills: 0    Comments: Future refills by primary care physician  Associated Diagnoses: Other acute pulmonary embolism without acute cor pulmonale (H)      HYDROcodone-acetaminophen (NORCO) 5-325 MG per tablet Take 1-2 tablets by mouth every 4 hours as needed for other (pain control or improvement in physical function.)  Qty: 18 tablet, Refills: 0    Associated Diagnoses: Other acute pulmonary embolism without acute cor pulmonale (H)         CONTINUE these medications which have NOT CHANGED    Details   Zolpidem Tartrate (AMBIEN PO) Take 5 mg by mouth At Bedtime (Takes half of a 10mg tablet)      multivitamin, therapeutic with minerals (MULTI-VITAMIN) TABS tablet Take 1 tablet by mouth daily      METOPROLOL SUCCINATE ER PO Take 50 mg by mouth daily         STOP taking these medications       ASPIRIN EC PO Comments:   Reason for Stopping:             Allergies   No Known Allergies  Data   Most Recent 3 CBC's:  Recent Labs   Lab Test  01/28/18   1251   WBC  8.5   HGB  14.6   MCV  92   PLT  212      Most Recent 3 BMP's:  Recent Labs   Lab Test  01/28/18   1251   NA  140   POTASSIUM  3.8   CHLORIDE  106   CO2  27   BUN  18   CR  0.75   ANIONGAP  7   VAN  9.0   GLC  87     Most Recent 2 LFT's:  Recent Labs   Lab Test  01/28/18   1251   AST  19   ALT  31   ALKPHOS  79   BILITOTAL  0.8     Most Recent INR's and Anticoagulation Dosing History:  Anticoagulation Dose History     Recent Dosing and Labs Latest Ref Rng & Units 1/28/2018    INR 0.86 - 1.14  1.03        Most Recent 3 Troponin's:  Recent Labs   Lab Test  01/28/18   1251   TROPI  0.040     Results for orders placed or performed during the hospital encounter of 01/28/18   CT Chest Pulmonary Embolism w Contrast     Value    Radiologist flags Tiny PE (AA)    Narrative    CT CHEST PULMONARY EMBOLISM WITH CONTRAST  1/28/2018 2:13 PM     HISTORY: Chest pain.    COMPARISON: None.    TECHNIQUE: Pulmonary embolism protocol with attention to the pulmonary  arteries.  IV contrast: 59 mL Isovue-370. Coronal reconstructions.  Radiation dose for this scan was reduced using automated exposure  control, adjustment of the mA and/or kV according to patient size, or  iterative reconstruction technique.    FINDINGS: No thoracic aortic dissection. There is a tiny right lower  lobe pulmonary embolism on axial images 114-116. This measures 0.7 cm  in length.   [Critical Result: Tiny PE]    Finding was identified on 1/28/2018 2:15 PM.     Dr. García was contacted by me on 1/28/2018 2:18 PM and verbalized  understanding of the critical result.     Small left liver lesion which has a density measurement consistent  with a cyst. No pleural effusion. Bilateral atelectasis, greater on  the left.       Impression    IMPRESSION: Tiny right PE.    SNAA HINOJOSA MD

## 2018-01-29 NOTE — PLAN OF CARE
Problem: Patient Care Overview  Goal: Discharge Needs Assessment  Outcome: No Change  - Anticoagulants initiated: Met  - Diagnostic tests and consults completed and resulted : NA  - Vital signs normal or at patient baseline: Met  - Adequate pain control on oral analgesia if ordered: Partially met, reports some mild pain with deep breaths  - Return to baseline functional status: Met  - Safe disposition plan has been identified: Partially met